# Patient Record
Sex: MALE | Race: WHITE | NOT HISPANIC OR LATINO | URBAN - METROPOLITAN AREA
[De-identification: names, ages, dates, MRNs, and addresses within clinical notes are randomized per-mention and may not be internally consistent; named-entity substitution may affect disease eponyms.]

---

## 2024-06-09 ENCOUNTER — HOSPITAL ENCOUNTER (OUTPATIENT)
Dept: RADIOLOGY | Facility: CLINIC | Age: 79
Discharge: HOME | End: 2024-06-09
Payer: MEDICARE

## 2024-06-09 DIAGNOSIS — R05.1 ACUTE COUGH: ICD-10-CM

## 2024-06-09 PROCEDURE — 71046 X-RAY EXAM CHEST 2 VIEWS: CPT

## 2024-06-09 PROCEDURE — 71046 X-RAY EXAM CHEST 2 VIEWS: CPT | Performed by: RADIOLOGY

## 2024-08-09 ENCOUNTER — OFFICE VISIT (OUTPATIENT)
Dept: ORTHOPEDIC SURGERY | Facility: HOSPITAL | Age: 79
End: 2024-08-09
Payer: MEDICARE

## 2024-08-09 ENCOUNTER — HOSPITAL ENCOUNTER (OUTPATIENT)
Dept: RADIOLOGY | Facility: EXTERNAL LOCATION | Age: 79
Discharge: HOME | End: 2024-08-09

## 2024-08-09 DIAGNOSIS — M17.0 PRIMARY LOCALIZED OSTEOARTHRITIS OF KNEES, BILATERAL: ICD-10-CM

## 2024-08-09 PROCEDURE — 20611 DRAIN/INJ JOINT/BURSA W/US: CPT | Mod: 50 | Performed by: FAMILY MEDICINE

## 2024-08-09 PROCEDURE — 99214 OFFICE O/P EST MOD 30 MIN: CPT | Performed by: FAMILY MEDICINE

## 2024-08-09 PROCEDURE — 1125F AMNT PAIN NOTED PAIN PRSNT: CPT | Performed by: FAMILY MEDICINE

## 2024-08-09 RX ORDER — MELOXICAM 15 MG/1
15 TABLET ORAL DAILY
Qty: 15 TABLET | Refills: 0 | Status: SHIPPED | OUTPATIENT
Start: 2024-08-09 | End: 2024-08-24

## 2024-08-09 ASSESSMENT — PAIN SCALES - GENERAL: PAINLEVEL_OUTOF10: 3

## 2024-08-09 ASSESSMENT — PAIN DESCRIPTION - DESCRIPTORS: DESCRIPTORS: ACHING;DULL;DISCOMFORT

## 2024-08-09 ASSESSMENT — PAIN - FUNCTIONAL ASSESSMENT: PAIN_FUNCTIONAL_ASSESSMENT: 0-10

## 2024-08-09 NOTE — PROGRESS NOTES
Patient ID: Bereket Raines is a 79 y.o. male.    L Inj/Asp: bilateral knee on 8/9/2024 11:33 AM  Indications: pain  Details: 21 G needle, ultrasound-guided superolateral approach  Medications (Right): 60 mg sodium hyaluronate 60 mg/3 mL  Medications (Left): 60 mg sodium hyaluronate 60 mg/3 mL  Outcome: tolerated well, no immediate complications  Procedure, treatment alternatives, risks and benefits explained, specific risks discussed. Consent was given by the patient. Immediately prior to procedure a time out was called to verify the correct patient, procedure, equipment, support staff and site/side marked as required. Patient was prepped and draped in the usual sterile fashion.           Sports Medicine Office Note    Today's Date:  08/09/2024     HPI: Bereket Raines is a 79 y.o. pleasant retired  here today for evaluation of bilateral knee pain with DJD.     On 6/28/2023, he presented for possible viscosupplementation injections upon referral by Dr. Rodríguez. He has a history of chronic bilateral knee pain that is worsened over the years. He recently saw Dr. Rodríguez and he declined any need for joint replacement. He received Monovisc injections on 1/12/2023 while living in [place] for the winter and this is worn off on the left knee 2 weeks ago but still is benefiting the right knee. He would like to continue gel injections as long as possible. He likes to be active and ambulatory without much, discomfort.  We agreed to continue treating his knee DJD pain conservatively. We will pursue insurance approval to repeat viscosupplementation of both knees. He would like to continue with Monovisc if possible. We agreed upon trying medial  braces at both knees and he was fitted for this today. I recommend that he maximize APAP and topical diclofenac for pain control. I am happy to see him back to give the injections.     On 8/2/2023, he returns for 6-week follow-up of chronic bilateral knee DJD and to  start a trial of viscosupplementation with Durolane. He has had Monovisc in the past while in [place] and this gave him good relief. These have worn off from his January 20 3 injections and he is requesting long-term analgesia. He denies interval injury or trauma. He has not yet tried his  knee braces although he does have them. He is looking forward to trying them this weekend.  We agreed to treating both knees with Durolane viscosupplementation injections. He tolerated this well. Activity modifications were reviewed. He will continue his current conservative treatment plan. He wants to hold off joint replacements as long as possible. He will try his knee braces. I have see him back when his pain returns.     Today, 8/9/2024, he returns for a 1 year follow-up of chronic bilateral knee DJD pain and would like to repeat viscosupplementation with Durolane.  The previous injections that I provided 1 year ago gave him 6 months of relief.  At the time when they wore off, February 2024, he was in [place] and his orthopedist there gave him Monovisc injections to both knees.  These have worn off and he would like to repeat injections for long-term analgesia.  These are helping greatly.  He takes occasional naproxen.  He wears compressive knee sleeves regularly.  He occasionally wears the  knee braces but not when he plays golf.  He denies interval injury or trauma.  He would like to avoid joint replacement.  He is traveling out of the country next month and would like a couple of weeks of NSAIDs if possible if he has breakthrough pain.    He has no other complaints.    Physical Examination:     The RIGHT knee has trace  joint effusion. Patella crepitus and grind are positive. There is minimal tenderness to the medial and lateral joint lines. Flexion and extension are without mechanical blocking. There is no instability with stress testing.   The LEFT knee has trace  joint effusion. Patella crepitus and grind  are positive. There is minimal tenderness to the medial and lateral joint lines. Flexion and extension are without mechanical blocking. There is no instability with stress testing.   Skin - no rashes, sores, or open lesions. Strength, sensory and vascular exams are otherwise normal. There is no clubbing, cyanosis or edema.  Gait is slightly antalgic and tandem.      Procedure Note:    Procedure #1: After consent was obtained, the RIGHT knee was prepped in a sterile fashion. Ultrasound guidance was used to help insure proper needle placement into the knee joint, decrease patient discomfort, and decrease collateral damage. The joint was visualized and Durolane was injected without any complications. Ultrasound images were saved on an internal file for later reference. The patient tolerated the procedure well and the area was cleaned and bandaged.    Procedure #2: After consent was obtained, the LEFT knee was prepped in a sterile fashion. Ultrasound guidance was used to help insure proper needle placement into the knee joint, decrease patient discomfort, and decrease collateral damage. The joint was aspirated of 20 mL of clear, straw-colored joint fluid; and Durolane was injected without any complications. Ultrasound images were saved on an internal file for later reference. The patient tolerated the procedure well and the area was cleaned and bandaged.      Problem List Items Addressed This Visit    None  Visit Diagnoses         Codes    Primary localized osteoarthritis of knees, bilateral     M17.0    Relevant Medications    meloxicam (Mobic) 15 mg tablet    Other Relevant Orders    Point of Care Ultrasound (Completed)            Assessment and Plan:     We reviewed the exam and x-ray findings and discussed the conservative and surgical treatment options. We agreed to repeat viscosupplementation to both knees with Durolane.  The left knee was aspirated also.  He tolerated this well.  Activity modifications were  reviewed.  He will continue his current conservative treatment plan and follow-up as needed.  He was given 2 weeks of meloxicam to take on his trip out of the country.    **This note was dictated using Dragon speech recognition software and was not corrected for spelling or grammatical errors**.    Rob Domínguez MD  Sports Medicine Specialist  [facility] [place]